# Patient Record
Sex: MALE | Race: WHITE | NOT HISPANIC OR LATINO | Employment: FULL TIME | URBAN - NONMETROPOLITAN AREA
[De-identification: names, ages, dates, MRNs, and addresses within clinical notes are randomized per-mention and may not be internally consistent; named-entity substitution may affect disease eponyms.]

---

## 2017-07-12 ENCOUNTER — OFFICE VISIT (OUTPATIENT)
Dept: FAMILY MEDICINE CLINIC | Facility: CLINIC | Age: 22
End: 2017-07-12

## 2017-07-12 VITALS
SYSTOLIC BLOOD PRESSURE: 127 MMHG | DIASTOLIC BLOOD PRESSURE: 67 MMHG | OXYGEN SATURATION: 100 % | BODY MASS INDEX: 26.19 KG/M2 | HEART RATE: 58 BPM | TEMPERATURE: 98.2 F | WEIGHT: 172.8 LBS | HEIGHT: 68 IN

## 2017-07-12 DIAGNOSIS — Z72.51 HIGH RISK SEXUAL BEHAVIOR: ICD-10-CM

## 2017-07-12 DIAGNOSIS — Z00.00 ENCOUNTER FOR PREVENTIVE HEALTH EXAMINATION: Primary | ICD-10-CM

## 2017-07-12 PROCEDURE — 99385 PREV VISIT NEW AGE 18-39: CPT | Performed by: INTERNAL MEDICINE

## 2017-07-12 NOTE — PATIENT INSTRUCTIONS
Health Maintenance, Male  A healthy lifestyle and preventative care can promote health and wellness.  · Maintain regular health, dental, and eye exams.  · Eat a healthy diet. Foods like vegetables, fruits, whole grains, low-fat dairy products, and lean protein foods contain the nutrients you need and are low in calories. Decrease your intake of foods high in solid fats, added sugars, and salt. Get information about a proper diet from your health care provider, if necessary.  · Regular physical exercise is one of the most important things you can do for your health. Most adults should get at least 150 minutes of moderate-intensity exercise (any activity that increases your heart rate and causes you to sweat) each week. In addition, most adults need muscle-strengthening exercises on 2 or more days a week.    · Maintain a healthy weight. The body mass index (BMI) is a screening tool to identify possible weight problems. It provides an estimate of body fat based on height and weight. Your health care provider can find your BMI and can help you achieve or maintain a healthy weight. For males 20 years and older:    A BMI below 18.5 is considered underweight.    A BMI of 18.5 to 24.9 is normal.    A BMI of 25 to 29.9 is considered overweight.    A BMI of 30 and above is considered obese.  · Maintain normal blood lipids and cholesterol by exercising and minimizing your intake of saturated fat. Eat a balanced diet with plenty of fruits and vegetables. Blood tests for lipids and cholesterol should begin at age 20 and be repeated every 5 years. If your lipid or cholesterol levels are high, you are over age 50, or you are at high risk for heart disease, you may need your cholesterol levels checked more frequently. Ongoing high lipid and cholesterol levels should be treated with medicines if diet and exercise are not working.  · If you smoke, find out from your health care provider how to quit. If you do not use tobacco, do not  start.  · Lung cancer screening is recommended for adults aged 55-80 years who are at high risk for developing lung cancer because of a history of smoking. A yearly low-dose CT scan of the lungs is recommended for people who have at least a 30-pack-year history of smoking and are current smokers or have quit within the past 15 years. A pack year of smoking is smoking an average of 1 pack of cigarettes a day for 1 year (for example, a 30-pack-year history of smoking could mean smoking 1 pack a day for 30 years or 2 packs a day for 15 years). Yearly screening should continue until the smoker has stopped smoking for at least 15 years. Yearly screening should be stopped for people who develop a health problem that would prevent them from having lung cancer treatment.  · If you choose to drink alcohol, do not have more than 2 drinks per day. One drink is considered to be 12 oz (360 mL) of beer, 5 oz (150 mL) of wine, or 1.5 oz (45 mL) of liquor.  · Avoid the use of street drugs. Do not share needles with anyone. Ask for help if you need support or instructions about stopping the use of drugs.  · High blood pressure causes heart disease and increases the risk of stroke. High blood pressure is more likely to develop in:    People who have blood pressure in the end of the normal range (100-139/85-89 mm Hg).    People who are overweight or obese.    People who are .  · If you are 18-39 years of age, have your blood pressure checked every 3-5 years. If you are 40 years of age or older, have your blood pressure checked every year. You should have your blood pressure measured twice--once when you are at a hospital or clinic, and once when you are not at a hospital or clinic. Record the average of the two measurements. To check your blood pressure when you are not at a hospital or clinic, you can use:    An automated blood pressure machine at a pharmacy.    A home blood pressure monitor.  · If you are 45-79 years  old, ask your health care provider if you should take aspirin to prevent heart disease.  · Diabetes screening involves taking a blood sample to check your fasting blood sugar level. This should be done once every 3 years after age 45 if you are at a normal weight and without risk factors for diabetes. Testing should be considered at a younger age or be carried out more frequently if you are overweight and have at least 1 risk factor for diabetes.  · Colorectal cancer can be detected and often prevented. Most routine colorectal cancer screening begins at the age of 50 and continues through age 75. However, your health care provider may recommend screening at an earlier age if you have risk factors for colon cancer. On a yearly basis, your health care provider may provide home test kits to check for hidden blood in the stool. A small camera at the end of a tube may be used to directly examine the colon (sigmoidoscopy or colonoscopy) to detect the earliest forms of colorectal cancer. Talk to your health care provider about this at age 50 when routine screening begins. A direct exam of the colon should be repeated every 5-10 years through age 75, unless early forms of precancerous polyps or small growths are found.  · People who are at an increased risk for hepatitis B should be screened for this virus. You are considered at high risk for hepatitis B if:    You were born in a country where hepatitis B occurs often. Talk with your health care provider about which countries are considered high risk.    Your parents were born in a high-risk country and you have not received a shot to protect against hepatitis B (hepatitis B vaccine).    You have HIV or AIDS.    You use needles to inject street drugs.    You live with, or have sex with, someone who has hepatitis B.    You are a man who has sex with other men (MSM).    You get hemodialysis treatment.    You take certain medicines for conditions like cancer, organ  transplantation, and autoimmune conditions.  · Hepatitis C blood testing is recommended for all people born from 1945 through 1965 and any individual with known risk factors for hepatitis C.  · Healthy men should no longer receive prostate-specific antigen (PSA) blood tests as part of routine cancer screening. Talk to your health care provider about prostate cancer screening.  · Testicular cancer screening is not recommended for adolescents or adult males who have no symptoms. Screening includes self-exam, a health care provider exam, and other screening tests. Consult with your health care provider about any symptoms you have or any concerns you have about testicular cancer.  · Practice safe sex. Use condoms and avoid high-risk sexual practices to reduce the spread of sexually transmitted infections (STIs).  · You should be screened for STIs, including gonorrhea and chlamydia if:    You are sexually active and are younger than 24 years.    You are older than 24 years, and your health care provider tells you that you are at risk for this type of infection.    Your sexual activity has changed since you were last screened, and you are at an increased risk for chlamydia or gonorrhea. Ask your health care provider if you are at risk.  · If you are at risk of being infected with HIV, it is recommended that you take a prescription medicine daily to prevent HIV infection. This is called pre-exposure prophylaxis (PrEP). You are considered at risk if:    You are a man who has sex with other men (MSM).    You are a heterosexual man who is sexually active with multiple partners.    You take drugs by injection.    You are sexually active with a partner who has HIV.    Talk with your health care provider about whether you are at high risk of being infected with HIV. If you choose to begin PrEP, you should first be tested for HIV. You should then be tested every 3 months for as long as you are taking PrEP.  · Use sunscreen. Apply  sunscreen liberally and repeatedly throughout the day. You should seek shade when your shadow is shorter than you. Protect yourself by wearing long sleeves, pants, a wide-brimmed hat, and sunglasses year round whenever you are outdoors.  · Tell your health care provider of new moles or changes in moles, especially if there is a change in shape or color. Also, tell your health care provider if a mole is larger than the size of a pencil eraser.  · A one-time screening for abdominal aortic aneurysm (AAA) and surgical repair of large AAAs by ultrasound is recommended for men aged 65-75 years who are current or former smokers.  · Stay current with your vaccines (immunizations).     This information is not intended to replace advice given to you by your health care provider. Make sure you discuss any questions you have with your health care provider.     Document Released: 06/15/2009 Document Revised: 01/08/2016 Document Reviewed: 09/20/2016  "[employer]" Interactive Patient Education ©2017 [employer]

## 2017-07-16 NOTE — PROGRESS NOTES
Chief Complaint   Patient presents with   • Establish Care     patient is here to establish care       Subjective     History of Present Illness   Manuelito Soni is a 22 y.o. male presents to [facility].  Patient has no significant complaints at this time.  Chronic medical issues were reviewed.  Patient is overall healthy and is not on any medications.  Patient is requesting labs for routine health maintenance.  Preventive care was discussed.    Patient does mention that he has been sexually active with multiple partners in the past.  He has never been checked for STDs.  He denies any significant symptoms at this time.    The following portions of the patient's history were reviewed and updated as appropriate: allergies, current medications, past family history, past medical history, past social history, past surgical history and problem list.    Review of Systems   Constitutional: Negative for chills, fatigue and fever.   HENT: Negative for congestion, ear pain, rhinorrhea, sinus pressure and sore throat.    Eyes: Negative for visual disturbance.   Respiratory: Negative for cough, chest tightness, shortness of breath and wheezing.    Cardiovascular: Negative for chest pain, palpitations and leg swelling.   Gastrointestinal: Negative for abdominal pain, blood in stool, constipation, diarrhea, nausea and vomiting.   Endocrine: Negative for polydipsia and polyuria.   Genitourinary: Negative for dysuria and hematuria.   Musculoskeletal: Negative for back pain.   Skin: Negative for rash.   Neurological: Negative for dizziness, light-headedness, numbness and headaches.   Psychiatric/Behavioral: Negative for dysphoric mood and sleep disturbance. The patient is not nervous/anxious.        No Known Allergies    History reviewed. No pertinent past medical history.    Social History     Social History   • Marital status: Single     Spouse name: N/A   • Number of children: N/A   • Years of education: N/A     Occupational  "History   • Not on file.     Social History Main Topics   • Smoking status: Never Smoker   • Smokeless tobacco: Not on file   • Alcohol use 0.6 - 1.2 oz/week     1 - 2 Cans of beer per week      Comment: on weekends   • Drug use: No   • Sexual activity: Not on file     Other Topics Concern   • Not on file     Social History Narrative   • No narrative on file        Past Surgical History:   Procedure Laterality Date   • WISDOM TOOTH EXTRACTION         Family History   Problem Relation Age of Onset   • Hypertension Father    • Hyperlipidemia Father    • Diabetes Paternal Aunt        No current outpatient prescriptions on file.    Objective   /67 (BP Location: Left arm, Patient Position: Sitting)  Pulse 58  Temp 98.2 °F (36.8 °C) (Oral)   Ht 68\" (172.7 cm)  Wt 172 lb 12.8 oz (78.4 kg)  SpO2 100%  BMI 26.27 kg/m2    Physical Exam   Constitutional: He is oriented to person, place, and time. He appears well-nourished. No distress.   HENT:   Head: Atraumatic.   Right Ear: External ear normal.   Left Ear: External ear normal.   Eyes: Conjunctivae are normal. Right eye exhibits no discharge. Left eye exhibits no discharge.   Cardiovascular: Normal rate and regular rhythm.    No murmur heard.  Pulmonary/Chest: Effort normal and breath sounds normal. He has no wheezes. He has no rales.   Abdominal: Soft. Bowel sounds are normal. He exhibits no distension. There is no tenderness.   Neurological: He is alert and oriented to person, place, and time.   Skin: No rash noted. He is not diaphoretic.   Psychiatric: He has a normal mood and affect.   Nursing note and vitals reviewed.      Assessment/Plan   Manuelito was seen today for establish care.    Diagnoses and all orders for this visit:    Encounter for preventive health examination  -     CBC & Differential  -     Comprehensive Metabolic Panel  -     Lipid Panel    High risk sexual behavior  -     HIV-1/O/2 Ag/Ab w Reflex  -     Hepatitis Panel, " Acute          Discussion Summary:  22-year-old white male presenting to [facility] care.    1. Preventive Health Maintenance  - Baseline labs ordered per above.  - Vaccines reviewed and updated  - Preventive health measures were discussed including: healthy diet with increase in fruits and vegetables, regular exercise at least 3 times a week, safe sex practices, avoidance of drugs, tobacco, and alcohol, and regular seatbelt use.    2.  STD screening  -Labs ordered as noted above.         Follow up:  Return in about 1 year (around 7/12/2018) for Annual physical.     Patient Instructions:  Patient instructions were provided.

## 2018-03-21 ENCOUNTER — OFFICE VISIT (OUTPATIENT)
Dept: INTERNAL MEDICINE | Facility: CLINIC | Age: 23
End: 2018-03-21

## 2018-03-21 VITALS
OXYGEN SATURATION: 100 % | HEIGHT: 68 IN | BODY MASS INDEX: 24.71 KG/M2 | HEART RATE: 86 BPM | WEIGHT: 163 LBS | TEMPERATURE: 97.9 F

## 2018-03-21 DIAGNOSIS — N48.89 PENILE PAIN: ICD-10-CM

## 2018-03-21 DIAGNOSIS — A54.9 GONORRHEA IN MALE: Primary | ICD-10-CM

## 2018-03-21 LAB
BILIRUB BLD-MCNC: NEGATIVE MG/DL
CLARITY, POC: CLEAR
COLOR UR: YELLOW
GLUCOSE UR STRIP-MCNC: NEGATIVE MG/DL
KETONES UR QL: NEGATIVE
LEUKOCYTE EST, POC: NEGATIVE
NITRITE UR-MCNC: NEGATIVE MG/ML
PH UR: 7 [PH] (ref 5–8)
PROT UR STRIP-MCNC: NEGATIVE MG/DL
RBC # UR STRIP: NEGATIVE /UL
SP GR UR: 1.01 (ref 1–1.03)
UROBILINOGEN UR QL: NORMAL

## 2018-03-21 PROCEDURE — 99214 OFFICE O/P EST MOD 30 MIN: CPT | Performed by: INTERNAL MEDICINE

## 2018-03-21 PROCEDURE — 81003 URINALYSIS AUTO W/O SCOPE: CPT | Performed by: INTERNAL MEDICINE

## 2018-03-21 PROCEDURE — 96372 THER/PROPH/DIAG INJ SC/IM: CPT | Performed by: INTERNAL MEDICINE

## 2018-03-21 RX ORDER — AZITHROMYCIN 500 MG/1
1000 TABLET, FILM COATED ORAL ONCE
Qty: 2 TABLET | Refills: 0 | Status: SHIPPED | OUTPATIENT
Start: 2018-03-21 | End: 2018-03-21

## 2018-03-21 RX ORDER — CEFTRIAXONE 1 G/1
500 INJECTION, POWDER, FOR SOLUTION INTRAMUSCULAR; INTRAVENOUS ONCE
Status: COMPLETED | OUTPATIENT
Start: 2018-03-21 | End: 2018-03-21

## 2018-03-21 RX ADMIN — CEFTRIAXONE 500 MG: 1 INJECTION, POWDER, FOR SOLUTION INTRAMUSCULAR; INTRAVENOUS at 17:48

## 2018-03-26 LAB
C TRACH RRNA SPEC QL NAA+PROBE: NEGATIVE
N GONORRHOEA RRNA SPEC QL NAA+PROBE: NEGATIVE

## 2018-03-26 NOTE — PROGRESS NOTES
Chief Complaint   Patient presents with   • Pain     groin pain x 1 1/2 weeks       Subjective     History of Present Illness   Manuelito Soni is a 22 y.o. male presenting to [facility] care.  He does have concerns of penile pain on the dorsal shaft which has been present for the last 1 1/2 weeks.  He does admit to having multiple sexual partners.  He denies penile discharge, pain with urination, and pain with ejaculation.      The following portions of the patient's history were reviewed and updated as appropriate: allergies, current medications, past family history, past medical history, past social history, past surgical history and problem list.    Review of Systems   Constitutional: Negative for chills, fatigue and fever.   HENT: Negative for congestion, ear pain, rhinorrhea, sinus pressure and sore throat.    Eyes: Negative for visual disturbance.   Respiratory: Negative for cough, chest tightness, shortness of breath and wheezing.    Cardiovascular: Negative for chest pain, palpitations and leg swelling.   Gastrointestinal: Negative for abdominal pain, blood in stool, constipation, diarrhea, nausea and vomiting.   Endocrine: Negative for polydipsia and polyuria.   Genitourinary: Positive for penile pain. Negative for dysuria and hematuria.   Musculoskeletal: Negative for back pain.   Skin: Negative for rash.   Neurological: Negative for dizziness, light-headedness, numbness and headaches.   Psychiatric/Behavioral: Negative for dysphoric mood and sleep disturbance. The patient is not nervous/anxious.        No Known Allergies    No past medical history on file.    Social History     Social History   • Marital status: Single     Spouse name: N/A   • Number of children: N/A   • Years of education: N/A     Occupational History   • Not on file.     Social History Main Topics   • Smoking status: Never Smoker   • Smokeless tobacco: Not on file   • Alcohol use 0.6 - 1.2 oz/week     1 - 2 Cans of beer per week       "Comment: on weekends   • Drug use: No   • Sexual activity: Not on file     Other Topics Concern   • Not on file     Social History Narrative   • No narrative on file        Past Surgical History:   Procedure Laterality Date   • WISDOM TOOTH EXTRACTION         Family History   Problem Relation Age of Onset   • Hypertension Father    • Hyperlipidemia Father    • Diabetes Paternal Aunt        No current outpatient prescriptions on file.    Objective   Pulse 86   Temp 97.9 °F (36.6 °C)   Ht 172.7 cm (68\")   Wt 73.9 kg (163 lb)   SpO2 100%   BMI 24.78 kg/m²     Physical Exam   Constitutional: He is oriented to person, place, and time. He appears well-developed and well-nourished.   HENT:   Head: Normocephalic and atraumatic.   Eyes: Conjunctivae are normal.   Cardiovascular: Normal rate, regular rhythm and normal heart sounds.    Pulmonary/Chest: Effort normal.   Abdominal: Soft. Bowel sounds are normal.   Genitourinary: Penis normal.   Musculoskeletal: Normal range of motion.   Neurological: He is alert and oriented to person, place, and time.   Skin: Skin is warm and dry.   Psychiatric: He has a normal mood and affect. His behavior is normal.   Nursing note and vitals reviewed.      Assessment/Plan   Manuelito was seen today for pain.    Diagnoses and all orders for this visit:    Gonorrhea in male  -     Chlamydia trachomatis, Neisseria gonorrhoeae, PCR - Urine, Urine, Clean Catch  -     cefTRIAXone (ROCEPHIN) injection 500 mg; Inject 0.5 g into the shoulder, thigh, or buttocks 1 (One) Time.  -     azithromycin (ZITHROMAX) 500 MG tablet; Take 2 tablets by mouth 1 (One) Time for 1 dose.    Penile pain  -     POC Urinalysis Dipstick, Automated  -     cefTRIAXone (ROCEPHIN) injection 500 mg; Inject 0.5 g into the shoulder, thigh, or buttocks 1 (One) Time.          Discussion Summary:    21 yo W M presenting for acute concerns of penile pain.  Given his history, we will cover for gonorrhea and chlamydia.  Symptoms are " to be monitored, if they do not improve, urology evaluation may be considered.  Safe sex practices were encouraged.      Follow up:  No Follow-up on file.     Patient Instructions:  Patient instructions were provided.

## 2018-03-27 NOTE — PROGRESS NOTES
Let the patient know his Urine tests were negative.  If he still has pain, we can send him to urology.

## 2018-03-31 LAB
ALBUMIN SERPL-MCNC: 4.6 G/DL (ref 3.5–5)
ALBUMIN/GLOB SERPL: 1.6 G/DL (ref 1–2)
ALP SERPL-CCNC: 64 U/L (ref 38–126)
ALT SERPL-CCNC: 41 U/L (ref 13–69)
AST SERPL-CCNC: 36 U/L (ref 15–46)
BASOPHILS # BLD AUTO: 0.06 10*3/MM3 (ref 0–0.2)
BASOPHILS NFR BLD AUTO: 1.7 % (ref 0–2.5)
BILIRUB SERPL-MCNC: 1 MG/DL (ref 0.2–1.3)
BUN SERPL-MCNC: 22 MG/DL (ref 7–20)
BUN/CREAT SERPL: 22 (ref 6.3–21.9)
CALCIUM SERPL-MCNC: 10.1 MG/DL (ref 8.4–10.2)
CHLORIDE SERPL-SCNC: 100 MMOL/L (ref 98–107)
CHOLEST SERPL-MCNC: 143 MG/DL (ref 0–199)
CO2 SERPL-SCNC: 30 MMOL/L (ref 26–30)
CREAT SERPL-MCNC: 1 MG/DL (ref 0.6–1.3)
EOSINOPHIL # BLD AUTO: 0.09 10*3/MM3 (ref 0–0.7)
EOSINOPHIL NFR BLD AUTO: 2.6 % (ref 0–7)
ERYTHROCYTE [DISTWIDTH] IN BLOOD BY AUTOMATED COUNT: 11.9 % (ref 11.5–14.5)
GFR SERPLBLD CREATININE-BSD FMLA CKD-EPI: 111 ML/MIN/1.73
GFR SERPLBLD CREATININE-BSD FMLA CKD-EPI: 92 ML/MIN/1.73
GLOBULIN SER CALC-MCNC: 2.8 GM/DL
GLUCOSE SERPL-MCNC: 87 MG/DL (ref 74–98)
HAV IGM SERPL QL IA: NEGATIVE
HBV CORE IGM SERPL QL IA: NEGATIVE
HBV SURFACE AG SERPL QL IA: NEGATIVE
HCT VFR BLD AUTO: 47.1 % (ref 42–52)
HCV AB S/CO SERPL IA: 0.1 S/CO RATIO (ref 0–0.9)
HDLC SERPL-MCNC: 50 MG/DL (ref 40–60)
HGB BLD-MCNC: 15.6 G/DL (ref 14–18)
HIV 1+2 AB+HIV1 P24 AG SERPL QL IA: NON REACTIVE
IMM GRANULOCYTES # BLD: 0.01 10*3/MM3 (ref 0–0.06)
IMM GRANULOCYTES NFR BLD: 0.3 % (ref 0–0.6)
LDLC SERPL CALC-MCNC: 79 MG/DL (ref 0–99)
LYMPHOCYTES # BLD AUTO: 0.99 10*3/MM3 (ref 0.6–3.4)
LYMPHOCYTES NFR BLD AUTO: 28.9 % (ref 10–50)
MCH RBC QN AUTO: 29.5 PG (ref 27–31)
MCHC RBC AUTO-ENTMCNC: 33.1 G/DL (ref 30–37)
MCV RBC AUTO: 89.2 FL (ref 80–94)
MONOCYTES # BLD AUTO: 0.52 10*3/MM3 (ref 0–0.9)
MONOCYTES NFR BLD AUTO: 15.2 % (ref 0–12)
NEUTROPHILS # BLD AUTO: 1.76 10*3/MM3 (ref 2–6.9)
NEUTROPHILS NFR BLD AUTO: 51.3 % (ref 37–80)
NRBC BLD AUTO-RTO: 0 /100 WBC (ref 0–0)
PLATELET # BLD AUTO: 212 10*3/MM3 (ref 130–400)
POTASSIUM SERPL-SCNC: 4.2 MMOL/L (ref 3.5–5.1)
PROT SERPL-MCNC: 7.4 G/DL (ref 6.3–8.2)
RBC # BLD AUTO: 5.28 10*6/MM3 (ref 4.7–6.1)
SODIUM SERPL-SCNC: 142 MMOL/L (ref 137–145)
TRIGL SERPL-MCNC: 68 MG/DL
VLDLC SERPL CALC-MCNC: 13.6 MG/DL
WBC # BLD AUTO: 3.43 10*3/MM3 (ref 4.8–10.8)

## 2020-03-04 ENCOUNTER — OFFICE VISIT (OUTPATIENT)
Dept: INTERNAL MEDICINE | Facility: CLINIC | Age: 25
End: 2020-03-04

## 2020-03-04 VITALS
SYSTOLIC BLOOD PRESSURE: 144 MMHG | HEIGHT: 68 IN | DIASTOLIC BLOOD PRESSURE: 63 MMHG | BODY MASS INDEX: 26.37 KG/M2 | WEIGHT: 174 LBS | TEMPERATURE: 98.2 F | OXYGEN SATURATION: 100 % | HEART RATE: 86 BPM

## 2020-03-04 DIAGNOSIS — F41.9 ANXIETY: ICD-10-CM

## 2020-03-04 DIAGNOSIS — R07.89 OTHER CHEST PAIN: Primary | ICD-10-CM

## 2020-03-04 PROCEDURE — 93000 ELECTROCARDIOGRAM COMPLETE: CPT | Performed by: INTERNAL MEDICINE

## 2020-03-04 PROCEDURE — 99213 OFFICE O/P EST LOW 20 MIN: CPT | Performed by: INTERNAL MEDICINE

## 2020-03-04 NOTE — PROGRESS NOTES
Chief Complaint   Patient presents with   • Chest Pain     been having dull pain in the left side of chest in heart area for about 3 weeks, feels like pressure against chst when it happens.  Did sled pull in [place] and thinks may have over done it.       Subjective     History of Present Illness   Manuelito Soni is a 24 y.o. male presenting for follow up with acute concerns of left-sided chest pain which is been present for the last 3 weeks.  He states that he has been working out regularly on the bench presses.  He does have a muscular build.  He does not have any family history of cardiac issues at young age.  Patient states that he may have caused some pain when he was pulling a sled in [place] and may have overdone it a few weeks ago.  He states that he has a dull chest discomfort which is not associated with shortness of breath or exertion.  He denies any radiating symptoms.  No pain when he exercises.  He does have some stress factors in his life recently and is not sure if this is anxiety related.    The following portions of the patient's history were reviewed and updated as appropriate: allergies, current medications, past family history, past medical history, past social history, past surgical history and problem list.    Review of Systems   Constitutional: Negative for chills, fatigue and fever.   HENT: Negative for congestion, ear pain, rhinorrhea, sinus pressure and sore throat.    Eyes: Negative for visual disturbance.   Respiratory: Negative for cough, chest tightness, shortness of breath and wheezing.    Cardiovascular: Positive for chest pain. Negative for palpitations and leg swelling.   Gastrointestinal: Negative for abdominal pain, blood in stool, constipation, diarrhea, nausea and vomiting.   Endocrine: Negative for polydipsia and polyuria.   Genitourinary: Negative for dysuria and hematuria.   Musculoskeletal: Negative for arthralgias and back pain.   Skin: Negative for rash.   Neurological:  "Negative for dizziness, light-headedness, numbness and headaches.   Psychiatric/Behavioral: Negative for dysphoric mood and sleep disturbance. The patient is not nervous/anxious.        No Known Allergies    History reviewed. No pertinent past medical history.    Social History     Socioeconomic History   • Marital status: Single     Spouse name: Not on file   • Number of children: Not on file   • Years of education: Not on file   • Highest education level: Not on file   Tobacco Use   • Smoking status: Never Smoker   Substance and Sexual Activity   • Alcohol use: Yes     Alcohol/week: 1.0 - 2.0 standard drinks     Types: 1 - 2 Cans of beer per week     Comment: on weekends   • Drug use: No        Past Surgical History:   Procedure Laterality Date   • WISDOM TOOTH EXTRACTION         Family History   Problem Relation Age of Onset   • Hypertension Father    • Hyperlipidemia Father    • Diabetes Paternal Aunt        No current outpatient medications on file.    Objective   /63   Pulse 86   Temp 98.2 °F (36.8 °C)   Ht 172.7 cm (68\")   Wt 78.9 kg (174 lb)   SpO2 100%   BMI 26.46 kg/m²       Physical Exam   Constitutional: He is oriented to person, place, and time. He appears well-developed and well-nourished.   HENT:   Head: Normocephalic and atraumatic.   Mouth/Throat: Oropharynx is clear and moist. No oropharyngeal exudate.   Eyes: Pupils are equal, round, and reactive to light. Conjunctivae and EOM are normal. No scleral icterus.   Neck: Normal range of motion. Neck supple. No thyromegaly present.   Cardiovascular: Normal rate, regular rhythm and normal heart sounds. Exam reveals no gallop and no friction rub.   No murmur heard.  Pulmonary/Chest: Effort normal and breath sounds normal. No respiratory distress. He has no wheezes.   Musculoskeletal: Normal range of motion.   Lymphadenopathy:     He has no cervical adenopathy.   Neurological: He is alert and oriented to person, place, and time.   Skin: Skin is " warm and dry. No rash noted.   Psychiatric: He has a normal mood and affect. His behavior is normal.   Nursing note and vitals reviewed.      ECG 12 Lead  Date/Time: 3/5/2020 2:14 PM  Performed by: Maikol Vasquez DO  Authorized by: Maikol Vasquez DO   Comparison: not compared with previous ECG   Previous ECG: no previous ECG available  Rhythm: sinus rhythm  Rate: normal  BPM: 64  Conduction: conduction normal  ST Segments: ST segments normal  T Waves: T waves normal  QRS axis: normal  Other: no other findings    Clinical impression: normal ECG            Assessment/Plan   Manuelito was seen today for chest pain.    Diagnoses and all orders for this visit:    Other chest pain    Anxiety    Other orders  -     ECG 12 Lead          Discussion Summary:  Patient is a 24 y.o. male presenting for follow up with atypical chest pain.  Patient was reassured that it did not appear to be cardiac causes that was causing his chest pain.  Musculoskeletal causes is a consideration considering his pain developed after exertional activities.  He was advised to stretch and continue doing exercises.  He may take NSAIDs over-the-counter as needed for pain control.  He also has elements of possible anxiety.  He does not want to be on any medications for anxiety at this time.  We will continue to monitor symptoms.        Follow up:  Return if symptoms worsen or fail to improve.

## 2020-03-04 NOTE — PATIENT INSTRUCTIONS
Nonspecific Chest Pain  Chest pain can be caused by many different conditions. Some causes of chest pain can be life-threatening. These will require treatment right away. Serious causes of chest pain include:  · Heart attack.  · A tear in the body's main blood vessel.  · Redness and swelling (inflammation) around your heart.  · Blood clot in your lungs.  Other causes of chest pain may not be so serious. These include:  · Heartburn.  · Anxiety or stress.  · Damage to bones or muscles in your chest.  · Lung infections.  Chest pain can feel like:  · Pain or discomfort in your chest.  · Crushing, pressure, aching, or squeezing pain.  · Burning or tingling.  · Dull or sharp pain that is worse when you move, cough, or take a deep breath.  · Pain or discomfort that is also felt in your back, neck, jaw, shoulder, or arm, or pain that spreads to any of these areas.  It is hard to know whether your pain is caused by something that is serious or something that is not so serious. So it is important to see your doctor right away if you have chest pain.  Follow these instructions at home:  Medicines  · Take over-the-counter and prescription medicines only as told by your doctor.  · If you were prescribed an antibiotic medicine, take it as told by your doctor. Do not stop taking the antibiotic even if you start to feel better.  Lifestyle    · Rest as told by your doctor.  · Do not use any products that contain nicotine or tobacco, such as cigarettes, e-cigarettes, and chewing tobacco. If you need help quitting, ask your doctor.  · Do not drink alcohol.  · Make lifestyle changes as told by your doctor. These may include:  ? Getting regular exercise. Ask your doctor what activities are safe for you.  ? Eating a heart-healthy diet. A diet and nutrition specialist (dietitian) can help you to learn healthy eating options.  ? Staying at a healthy weight.  ? Treating diabetes or high blood pressure, if needed.  ? Lowering your stress.  Activities such as yoga and relaxation techniques can help.  General instructions  · Pay attention to any changes in your symptoms. Tell your doctor about them or any new symptoms.  · Avoid any activities that cause chest pain.  · Keep all follow-up visits as told by your doctor. This is important. You may need more testing if your chest pain does not go away.  Contact a doctor if:  · Your chest pain does not go away.  · You feel depressed.  · You have a fever.  Get help right away if:  · Your chest pain is worse.  · You have a cough that gets worse, or you cough up blood.  · You have very bad (severe) pain in your belly (abdomen).  · You pass out (faint).  · You have either of these for no clear reason:  ? Sudden chest discomfort.  ? Sudden discomfort in your arms, back, neck, or jaw.  · You have shortness of breath at any time.  · You suddenly start to sweat, or your skin gets clammy.  · You feel sick to your stomach (nauseous).  · You throw up (vomit).  · You suddenly feel lightheaded or dizzy.  · You feel very weak or tired.  · Your heart starts to beat fast, or it feels like it is skipping beats.  These symptoms may be an emergency. Do not wait to see if the symptoms will go away. Get medical help right away. Call your local emergency services (911 in the [place] Do not drive yourself to the hospital.  Summary  · Chest pain can be caused by many different conditions. The cause may be serious and need treatment right away. If you have chest pain, see your doctor right away.  · Follow your doctor's instructions for taking medicines and making lifestyle changes.  · Keep all follow-up visits as told by your doctor. This includes visits for any further testing if your chest pain does not go away.  · Be sure to know the signs that show that your condition has become worse. Get help right away if you have these symptoms.  This information is not intended to replace advice given to you by your health care provider. Make  sure you discuss any questions you have with your health care provider.  Document Released: 06/05/2009 Document Revised: 06/20/2019 Document Reviewed: 06/20/2019  [employer]vier Interactive Patient Education © 2020 [employer]

## 2022-01-25 ENCOUNTER — TELEPHONE (OUTPATIENT)
Dept: INTERNAL MEDICINE | Facility: CLINIC | Age: 27
End: 2022-01-25

## 2022-01-25 NOTE — TELEPHONE ENCOUNTER
Patient called complaining of dull chest pains/pressure that has been ongoing for 3 weeks now, was seen for this in March 2020, he has not been seen since than, and Dr. Vasquez has no openings today. I  recommended the ER or UC, due to the chest pains. He expressed that he will go to the UC.

## 2022-02-07 ENCOUNTER — OFFICE VISIT (OUTPATIENT)
Dept: FAMILY MEDICINE CLINIC | Facility: CLINIC | Age: 27
End: 2022-02-07

## 2022-02-07 ENCOUNTER — HOSPITAL ENCOUNTER (OUTPATIENT)
Dept: GENERAL RADIOLOGY | Facility: HOSPITAL | Age: 27
Discharge: HOME | End: 2022-02-07

## 2022-02-07 VITALS
SYSTOLIC BLOOD PRESSURE: 134 MMHG | TEMPERATURE: 98 F | OXYGEN SATURATION: 99 % | WEIGHT: 181 LBS | HEIGHT: 68 IN | DIASTOLIC BLOOD PRESSURE: 84 MMHG | BODY MASS INDEX: 27.43 KG/M2 | HEART RATE: 75 BPM

## 2022-02-07 DIAGNOSIS — R07.9 CHEST PAIN, UNSPECIFIED TYPE: Primary | ICD-10-CM

## 2022-02-07 LAB
ALBUMIN SERPL-MCNC: 4.8 G/DL (ref 3.5–5.2)
ALBUMIN/GLOB SERPL: 1.7 G/DL
ALP SERPL-CCNC: 80 U/L (ref 39–117)
ALT SERPL W P-5'-P-CCNC: 41 U/L (ref 1–41)
ANION GAP SERPL CALCULATED.3IONS-SCNC: 12.9 MMOL/L (ref 5–15)
AST SERPL-CCNC: 26 U/L (ref 1–40)
BASOPHILS # BLD AUTO: 0.06 10*3/MM3 (ref 0–0.2)
BASOPHILS NFR BLD AUTO: 0.7 % (ref 0–1.5)
BILIRUB SERPL-MCNC: 0.4 MG/DL (ref 0–1.2)
BUN SERPL-MCNC: 13 MG/DL (ref 6–20)
BUN/CREAT SERPL: 13.7 (ref 7–25)
CALCIUM SPEC-SCNC: 10.2 MG/DL (ref 8.6–10.5)
CHLORIDE SERPL-SCNC: 99 MMOL/L (ref 98–107)
CO2 SERPL-SCNC: 26.1 MMOL/L (ref 22–29)
CREAT SERPL-MCNC: 0.95 MG/DL (ref 0.76–1.27)
DEPRECATED RDW RBC AUTO: 37.3 FL (ref 37–54)
EOSINOPHIL # BLD AUTO: 0.26 10*3/MM3 (ref 0–0.4)
EOSINOPHIL NFR BLD AUTO: 3.2 % (ref 0.3–6.2)
ERYTHROCYTE [DISTWIDTH] IN BLOOD BY AUTOMATED COUNT: 12.1 % (ref 12.3–15.4)
GFR SERPL CREATININE-BSD FRML MDRD: 94 ML/MIN/1.73
GLOBULIN UR ELPH-MCNC: 2.9 GM/DL
GLUCOSE SERPL-MCNC: 94 MG/DL (ref 65–99)
HCT VFR BLD AUTO: 43.1 % (ref 37.5–51)
HGB BLD-MCNC: 14.7 G/DL (ref 13–17.7)
IMM GRANULOCYTES # BLD AUTO: 0.04 10*3/MM3 (ref 0–0.05)
IMM GRANULOCYTES NFR BLD AUTO: 0.5 % (ref 0–0.5)
LIPASE SERPL-CCNC: 28 U/L (ref 13–60)
LYMPHOCYTES # BLD AUTO: 1.94 10*3/MM3 (ref 0.7–3.1)
LYMPHOCYTES NFR BLD AUTO: 23.8 % (ref 19.6–45.3)
MCH RBC QN AUTO: 29 PG (ref 26.6–33)
MCHC RBC AUTO-ENTMCNC: 34.1 G/DL (ref 31.5–35.7)
MCV RBC AUTO: 85 FL (ref 79–97)
MONOCYTES # BLD AUTO: 0.63 10*3/MM3 (ref 0.1–0.9)
MONOCYTES NFR BLD AUTO: 7.7 % (ref 5–12)
NEUTROPHILS NFR BLD AUTO: 5.23 10*3/MM3 (ref 1.7–7)
NEUTROPHILS NFR BLD AUTO: 64.1 % (ref 42.7–76)
NRBC BLD AUTO-RTO: 0 /100 WBC (ref 0–0.2)
PLATELET # BLD AUTO: 275 10*3/MM3 (ref 140–450)
PMV BLD AUTO: 9.3 FL (ref 6–12)
POTASSIUM SERPL-SCNC: 3.9 MMOL/L (ref 3.5–5.2)
PROT SERPL-MCNC: 7.7 G/DL (ref 6–8.5)
RBC # BLD AUTO: 5.07 10*6/MM3 (ref 4.14–5.8)
SODIUM SERPL-SCNC: 138 MMOL/L (ref 136–145)
TROPONIN T SERPL-MCNC: <0.01 NG/ML (ref 0–0.03)
WBC NRBC COR # BLD: 8.16 10*3/MM3 (ref 3.4–10.8)

## 2022-02-07 PROCEDURE — 71046 X-RAY EXAM CHEST 2 VIEWS: CPT

## 2022-02-07 PROCEDURE — 99213 OFFICE O/P EST LOW 20 MIN: CPT

## 2022-02-07 PROCEDURE — 83690 ASSAY OF LIPASE: CPT

## 2022-02-07 PROCEDURE — 36415 COLL VENOUS BLD VENIPUNCTURE: CPT

## 2022-02-07 PROCEDURE — 85025 COMPLETE CBC W/AUTO DIFF WBC: CPT

## 2022-02-07 PROCEDURE — 93000 ELECTROCARDIOGRAM COMPLETE: CPT

## 2022-02-07 PROCEDURE — 84484 ASSAY OF TROPONIN QUANT: CPT

## 2022-02-07 PROCEDURE — 80053 COMPREHEN METABOLIC PANEL: CPT

## 2022-02-08 NOTE — PROGRESS NOTES
Acute Office Visit      Patient Name: Manuelito Soni  : 1995   MRN: [id number]     Chief Complaint:    Chief Complaint   Patient presents with   • Chest Pain     x2 months; sharp pain and then pressure, lasts 20mins or so   • Heartburn     has hx of heartburn, has gotten worse recently       History of Present Illness: Manuelito Soni is a 26 y.o. male who is here today for chest pain for 2 months.  Patient reports having left-sided chest pain that is dull and constant.  This is been there approximately 2 months.  Sometimes he will even note a burning sensation that will be at the back of his head.  At times he will have heartburn and reflux.  Primarily uses Tums as needed but notices that they are not helping as much as he used to.  He denies any numbness or tingling but does note 1 episode of aching down his left arm.  No nausea, vomiting, or abdominal pain.  Denies back pain or fever.  No previous cardiac history and does not take anything daily for his heartburn/reflux.  He states that he was seen in the past by his primary provider for a similar complaint and had a EKG performed.  His EKG was normal and was advised that this was noncardiac related.  No further testing was done at that time.    Subjective     Review of System: Review of Systems   Constitutional: Negative for chills, diaphoresis, fatigue and fever.   Eyes: Negative for visual disturbance.   Respiratory: Negative for cough, chest tightness and shortness of breath.    Cardiovascular: Positive for chest pain. Negative for palpitations and leg swelling.   Gastrointestinal: Negative for abdominal pain, diarrhea, nausea and vomiting.   Musculoskeletal: Negative for arthralgias, back pain and myalgias.   Neurological: Negative for headaches.      I have reviewed the ROS documented by my clinical staff, updated appropriately and I agree. FERNANDO Lugo    Past Medical History: History reviewed. No pertinent past medical history.    Past  "Surgical History:   Past Surgical History:   Procedure Laterality Date   • WISDOM TOOTH EXTRACTION         Family History:   Family History   Problem Relation Age of Onset   • Hypertension Father    • Hyperlipidemia Father    • Diabetes Paternal Aunt        Social History:   Social History     Socioeconomic History   • Marital status: Single   Tobacco Use   • Smoking status: Never Smoker   • Smokeless tobacco: Never Used   Vaping Use   • Vaping Use: Never used   Substance and Sexual Activity   • Alcohol use: Yes     Alcohol/week: 1.0 - 2.0 standard drink     Types: 1 - 2 Cans of beer per week     Comment: on weekends   • Drug use: No       Medications:     Current Outpatient Medications:   •  diclofenac (VOLTAREN) 50 MG EC tablet, Take 1 tablet by mouth 2 (Two) Times a Day As Needed (Musculoskeletal pain)., Disp: 14 tablet, Rfl: 0    Allergies:   No Known Allergies    Objective     Physical Exam:   Vital Signs:   Vitals:    02/07/22 1851   BP: 134/84   Pulse: 75   Temp: 98 °F (36.7 °C)   SpO2: 99%   Weight: 82.1 kg (181 lb)   Height: 172.7 cm (68\")     Body mass index is 27.52 kg/m².     Physical Exam  Vitals and nursing note reviewed.   Constitutional:       Appearance: Normal appearance.   HENT:      Head: Normocephalic and atraumatic.      Right Ear: Tympanic membrane, ear canal and external ear normal.      Left Ear: Tympanic membrane, ear canal and external ear normal.      Nose: Nose normal.      Mouth/Throat:      Mouth: Mucous membranes are moist.      Pharynx: Oropharynx is clear.   Eyes:      Extraocular Movements: Extraocular movements intact.      Conjunctiva/sclera: Conjunctivae normal.      Pupils: Pupils are equal, round, and reactive to light.   Cardiovascular:      Rate and Rhythm: Normal rate and regular rhythm.      Pulses: Normal pulses.      Heart sounds: Normal heart sounds.   Pulmonary:      Effort: Pulmonary effort is normal.      Breath sounds: Normal breath sounds.   Abdominal:      " General: Abdomen is flat. Bowel sounds are normal. There is no distension.      Palpations: Abdomen is soft.      Tenderness: There is no abdominal tenderness.   Musculoskeletal:      Cervical back: Neck supple. No tenderness.   Lymphadenopathy:      Cervical: No cervical adenopathy.   Skin:     General: Skin is warm and dry.      Capillary Refill: Capillary refill takes less than 2 seconds.   Neurological:      General: No focal deficit present.      Mental Status: He is alert and oriented to person, place, and time.   Psychiatric:         Mood and Affect: Mood normal.         Behavior: Behavior normal.       ECG 12 Lead    Date/Time: 2/7/2022 7:22 PM  Performed by: Brandon Franz APRN  Authorized by: Brandon Franz APRN   Comparison: compared with previous ECG   Similar to previous ECG  Rhythm: sinus rhythm  Rate: normal  BPM: 73  Conduction: conduction normal  ST Segments: ST segments normal  T Waves: T waves normal  QRS axis: normal  Other: no other findings    Clinical impression: normal ECG          Assessment / Plan      Assessment/Plan:   Diagnoses and all orders for this visit:    1. Chest pain, unspecified type (Primary)  -     ECG 12 Lead  -     XR Chest PA & Lateral  -     Lipase  -     Comprehensive Metabolic Panel  -     Troponin  -     CBC & Differential  -     diclofenac (VOLTAREN) 50 MG EC tablet; Take 1 tablet by mouth 2 (Two) Times a Day As Needed (Musculoskeletal pain).  Dispense: 14 tablet; Refill: 0         1. EKG obtained initially.  Was found to be normal sinus rhythm.  2. Discussed other treatment options with patient.  He is agreeable to have blood work and chest x-ray.  3. Lab work was within normal limits and chest x-ray showed no acute abnormalities.  4. Discussed results with patient, advised him that this pain is noncardiac related.  Will trial diclofenac to see if this is related to musculoskeletal pain.      Follow Up:   Return if symptoms worsen or fail to improve.    I spent  approximately 25 minutes providing clinical care for this patient; including review of patient's chart and provider documentation, face to face time spent with patient in examination room (obtaining history, performing physical exam, discussing diagnosis and management options), placing orders, and completing patient documentation.     FERNANDO Lugo  [facility]